# Patient Record
Sex: MALE | Race: WHITE | Employment: OTHER | URBAN - NONMETROPOLITAN AREA
[De-identification: names, ages, dates, MRNs, and addresses within clinical notes are randomized per-mention and may not be internally consistent; named-entity substitution may affect disease eponyms.]

---

## 2023-01-17 ENCOUNTER — HOSPITAL ENCOUNTER (EMERGENCY)
Age: 68
Discharge: HOME | End: 2023-01-17
Payer: MEDICARE

## 2023-01-17 ENCOUNTER — APPOINTMENT (OUTPATIENT)
Dept: CT IMAGING | Age: 68
End: 2023-01-17
Payer: MEDICARE

## 2023-01-17 VITALS
HEART RATE: 76 BPM | TEMPERATURE: 97.9 F | DIASTOLIC BLOOD PRESSURE: 71 MMHG | SYSTOLIC BLOOD PRESSURE: 144 MMHG | OXYGEN SATURATION: 95 % | RESPIRATION RATE: 18 BRPM

## 2023-01-17 DIAGNOSIS — N20.0 KIDNEY STONE: Primary | ICD-10-CM

## 2023-01-17 LAB
ANION GAP SERPL CALCULATED.3IONS-SCNC: 16 MEQ/L (ref 8–16)
BACTERIA: ABNORMAL /HPF
BASOPHILS # BLD: 0.4 %
BASOPHILS ABSOLUTE: 0 THOU/MM3 (ref 0–0.1)
BILIRUBIN URINE: NEGATIVE
BLOOD, URINE: ABNORMAL
BUN BLDV-MCNC: 15 MG/DL (ref 7–22)
CALCIUM SERPL-MCNC: 8.7 MG/DL (ref 8.5–10.5)
CASTS 2: ABNORMAL /LPF
CASTS UA: ABNORMAL /LPF
CHARACTER, URINE: CLEAR
CHLORIDE BLD-SCNC: 99 MEQ/L (ref 98–111)
CO2: 25 MEQ/L (ref 23–33)
COLOR: ABNORMAL
CREAT SERPL-MCNC: 0.9 MG/DL (ref 0.4–1.2)
CRYSTALS, UA: ABNORMAL
EOSINOPHIL # BLD: 0.3 %
EOSINOPHILS ABSOLUTE: 0 THOU/MM3 (ref 0–0.4)
EPITHELIAL CELLS, UA: ABNORMAL /HPF
ERYTHROCYTE [DISTWIDTH] IN BLOOD BY AUTOMATED COUNT: 13.8 % (ref 11.5–14.5)
ERYTHROCYTE [DISTWIDTH] IN BLOOD BY AUTOMATED COUNT: 44.5 FL (ref 35–45)
GFR SERPL CREATININE-BSD FRML MDRD: > 60 ML/MIN/1.73M2
GLUCOSE BLD-MCNC: 207 MG/DL (ref 70–108)
GLUCOSE URINE: NEGATIVE MG/DL
HCT VFR BLD CALC: 45.3 % (ref 42–52)
HEMOGLOBIN: 15.9 GM/DL (ref 14–18)
IMMATURE GRANS (ABS): 0.03 THOU/MM3 (ref 0–0.07)
IMMATURE GRANULOCYTES: 0.3 %
KETONES, URINE: ABNORMAL
LEUKOCYTE ESTERASE, URINE: NEGATIVE
LYMPHOCYTES # BLD: 23.5 %
LYMPHOCYTES ABSOLUTE: 2.6 THOU/MM3 (ref 1–4.8)
MCH RBC QN AUTO: 31 PG (ref 26–33)
MCHC RBC AUTO-ENTMCNC: 35.1 GM/DL (ref 32.2–35.5)
MCV RBC AUTO: 88.3 FL (ref 80–94)
MISCELLANEOUS 2: ABNORMAL
MONOCYTES # BLD: 3.4 %
MONOCYTES ABSOLUTE: 0.4 THOU/MM3 (ref 0.4–1.3)
NITRITE, URINE: NEGATIVE
NUCLEATED RED BLOOD CELLS: 0 /100 WBC
OSMOLALITY CALCULATION: 286.3 MOSMOL/KG (ref 275–300)
PH UA: 5 (ref 5–9)
PLATELET # BLD: 200 THOU/MM3 (ref 130–400)
PMV BLD AUTO: 9.9 FL (ref 9.4–12.4)
POTASSIUM SERPL-SCNC: 3.5 MEQ/L (ref 3.5–5.2)
PROTEIN UA: 30
RBC # BLD: 5.13 MILL/MM3 (ref 4.7–6.1)
RBC URINE: ABNORMAL /HPF
RENAL EPITHELIAL, UA: ABNORMAL
SEG NEUTROPHILS: 72.1 %
SEGMENTED NEUTROPHILS ABSOLUTE COUNT: 8.1 THOU/MM3 (ref 1.8–7.7)
SODIUM BLD-SCNC: 140 MEQ/L (ref 135–145)
SPECIFIC GRAVITY, URINE: 1.02 (ref 1–1.03)
UROBILINOGEN, URINE: 1 EU/DL (ref 0–1)
WBC # BLD: 11.2 THOU/MM3 (ref 4.8–10.8)
WBC UA: ABNORMAL /HPF
YEAST: ABNORMAL

## 2023-01-17 PROCEDURE — 74177 CT ABD & PELVIS W/CONTRAST: CPT

## 2023-01-17 PROCEDURE — 36415 COLL VENOUS BLD VENIPUNCTURE: CPT

## 2023-01-17 PROCEDURE — 96374 THER/PROPH/DIAG INJ IV PUSH: CPT

## 2023-01-17 PROCEDURE — 81001 URINALYSIS AUTO W/SCOPE: CPT

## 2023-01-17 PROCEDURE — 96375 TX/PRO/DX INJ NEW DRUG ADDON: CPT

## 2023-01-17 PROCEDURE — 74176 CT ABD & PELVIS W/O CONTRAST: CPT

## 2023-01-17 PROCEDURE — 85025 COMPLETE CBC W/AUTO DIFF WBC: CPT

## 2023-01-17 PROCEDURE — 6360000002 HC RX W HCPCS: Performed by: PHYSICIAN ASSISTANT

## 2023-01-17 PROCEDURE — 6360000004 HC RX CONTRAST MEDICATION: Performed by: PHYSICIAN ASSISTANT

## 2023-01-17 PROCEDURE — 99285 EMERGENCY DEPT VISIT HI MDM: CPT

## 2023-01-17 PROCEDURE — 80048 BASIC METABOLIC PNL TOTAL CA: CPT

## 2023-01-17 PROCEDURE — 2580000003 HC RX 258: Performed by: PHYSICIAN ASSISTANT

## 2023-01-17 RX ORDER — ONDANSETRON 2 MG/ML
4 INJECTION INTRAMUSCULAR; INTRAVENOUS ONCE
Status: COMPLETED | OUTPATIENT
Start: 2023-01-17 | End: 2023-01-17

## 2023-01-17 RX ORDER — KETOROLAC TROMETHAMINE 10 MG/1
10 TABLET, FILM COATED ORAL EVERY 6 HOURS PRN
Qty: 12 TABLET | Refills: 0 | Status: SHIPPED | OUTPATIENT
Start: 2023-01-17 | End: 2023-01-20

## 2023-01-17 RX ORDER — ONDANSETRON 4 MG/1
4 TABLET, ORALLY DISINTEGRATING ORAL 3 TIMES DAILY PRN
Qty: 20 TABLET | Refills: 0 | Status: SHIPPED | OUTPATIENT
Start: 2023-01-17

## 2023-01-17 RX ORDER — 0.9 % SODIUM CHLORIDE 0.9 %
500 INTRAVENOUS SOLUTION INTRAVENOUS ONCE
Status: COMPLETED | OUTPATIENT
Start: 2023-01-17 | End: 2023-01-17

## 2023-01-17 RX ORDER — MORPHINE SULFATE 4 MG/ML
4 INJECTION, SOLUTION INTRAMUSCULAR; INTRAVENOUS ONCE
Status: COMPLETED | OUTPATIENT
Start: 2023-01-17 | End: 2023-01-17

## 2023-01-17 RX ADMIN — HYDROMORPHONE HYDROCHLORIDE 0.5 MG: 1 INJECTION, SOLUTION INTRAMUSCULAR; INTRAVENOUS; SUBCUTANEOUS at 13:13

## 2023-01-17 RX ADMIN — ONDANSETRON 4 MG: 2 INJECTION INTRAMUSCULAR; INTRAVENOUS at 11:31

## 2023-01-17 RX ADMIN — MORPHINE SULFATE 4 MG: 4 INJECTION, SOLUTION INTRAMUSCULAR; INTRAVENOUS at 11:31

## 2023-01-17 RX ADMIN — SODIUM CHLORIDE 500 ML: 9 INJECTION, SOLUTION INTRAVENOUS at 11:23

## 2023-01-17 RX ADMIN — IOPAMIDOL 80 ML: 755 INJECTION, SOLUTION INTRAVENOUS at 15:39

## 2023-01-17 NOTE — ED PROVIDER NOTES
[address] \[facility]\"" [address] [address] EMERGENCY DEPT      EMERGENCY MEDICINE     Pt Name: Nay Martell  MRN: [id number]  [place] 1955  Date of evaluation: 1/17/2023  Provider: Juice Regalado PA-C    CHIEF COMPLAINT       Chief Complaint   Patient presents with    Flank Pain     left     HISTORY OF PRESENT ILLNESS   Nay Martell is a pleasant 74 y.o. male who presents to the emergency department from from home, by private vehicle for evaluation of left sided flank pain. Pt states that after waking up this morning normal and stool output. Shortly after, he developed sharp, constant left-sided flank pain. He states after the pain started he felt the frequent urge to urinate and defecate but is unable to. He  denies dysuria or hematuria. Pt reports some abdominal cramping which he attributes to the urge to defecate and urinate. He denies radiation of the flank pain up his back or down into his groin. The pain is a 8/10 at rest, he is unable to find a comfortable position. He endorses chills and diaphoresis since the pain began. Pt reports a past history of kidney stones almost 20 years ago. He is unable to remember if the pain is similar. He denies fever, nausea, vomiting, numbness or tingling down his legs or saddle anesthesia. PASTMEDICAL HISTORY     Past Medical History:   Diagnosis Date    Gout     Hypertension     Nephrolithiasis        Patient Active Problem List   Diagnosis Code    Nephrolithiasis N20.0    Hypertension I10    Gout M10.9     SURGICAL HISTORY     No past surgical history on file. CURRENT MEDICATIONS       Discharge Medication List as of 1/17/2023  4:24 PM        CONTINUE these medications which have NOT CHANGED    Details   allopurinol (ZYLOPRIM) 300 MG tablet Take 1 tablet by mouth daily. , Disp-90 tablet, R-3      atenolol (TENORMIN) 50 MG tablet Take 1 tablet by mouth daily. , Disp-90 tablet, R-3      lisinopril-hydrochlorothiazide (PRINZIDE;ZESTORETIC) 10-12.5 MG per tablet Take 1 tablet by mouth 2 times daily. , Disp-180 tablet, R-3             ALLERGIES     has No Known Allergies. FAMILY HISTORY     has no family status information on file. SOCIAL HISTORY       Social History     Tobacco Use    Smoking status: Never       PHYSICAL EXAM       ED Triage Vitals [01/17/23 1024]   BP Temp Temp Source Heart Rate Resp SpO2 Height Weight   (!) 144/71 97.9 °F (36.6 °C) Oral 76 18 95 % -- --       Additional Vital Signs:  Vitals:    01/17/23 1024   BP: (!) 144/71   Pulse: 76   Resp: 18   Temp: 97.9 °F (36.6 °C)   SpO2: 95%     Physical Exam  Vitals and nursing note reviewed. Constitutional:       General: He is not in acute distress. Appearance: Normal appearance. He is well-developed. He is not diaphoretic. HENT:      Head: Normocephalic and atraumatic. Eyes:      Conjunctiva/sclera: Conjunctivae normal.      Pupils: Pupils are equal, round, and reactive to light. Cardiovascular:      Rate and Rhythm: Normal rate and regular rhythm. Pulses: Normal pulses. Heart sounds: Normal heart sounds. No murmur heard. No gallop. Pulmonary:      Effort: Pulmonary effort is normal. No respiratory distress. Breath sounds: Normal breath sounds. No wheezing or rales. Abdominal:      General: Bowel sounds are normal.      Palpations: Abdomen is soft. Tenderness: There is no abdominal tenderness. There is left CVA tenderness. There is no right CVA tenderness, guarding or rebound. Musculoskeletal:         General: Normal range of motion. Cervical back: Normal range of motion. Skin:     General: Skin is warm and dry. Capillary Refill: Capillary refill takes less than 2 seconds. Neurological:      Mental Status: He is alert and oriented to person, place, and time.    Psychiatric:         Mood and Affect: Mood normal.       FORMAL DIAGNOSTIC RESULTS     RADIOLOGY: Interpretation per the Radiologist below, if available at the time of this note (none if blank):    CT ABDOMEN PELVIS W IV CONTRAST Additional Contrast? None   Final Result   1. Bosniak 1 and 2 cysts are described above   2. Subtle left hydronephrosis with a punctate calcification in the posterior left bladder likely reflecting a recently passed calculus. 3. 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 It may contain minor errors which are inherent in voice recognition technology. **      Final report electronically signed by Dr. Deana Nielson on 1/17/2023 3:59 PM      CT ABDOMEN PELVIS WO CONTRAST Additional Contrast? None   Final Result       1. No evidence of acute intra-abdominal or intrapelvic abnormality. 2. Bilateral renal cystic lesions which are not fully characterized. The left-sided cystic lesion is more poorly defined than on the right. Consider routine follow-up multiphase imaging for further characterization. 3. Hepatic steatosis. **This report has been created using voice recognition software. It may contain minor errors which are inherent in voice recognition technology. **      Final report electronically signed by Dr. Qiana Katz MD on 1/17/2023 12:37 PM          LABS: (none if blank)  Labs Reviewed   URINE WITH REFLEXED MICRO - Abnormal; Notable for the following components:       Result Value    Ketones, Urine TRACE (*)     Blood, Urine LARGE (*)     Protein, UA 30 (*)     Color, UA DK YELLOW (*)     All other components within normal limits   CBC WITH AUTO DIFFERENTIAL - Abnormal; Notable for the following components:    WBC 11.2 (*)     Segs Absolute 8.1 (*)     All other components within normal limits   BASIC METABOLIC PANEL - Abnormal; Notable for the following components:    Glucose 207 (*)     All other components within normal limits   ANION GAP   GLOMERULAR FILTRATION RATE, ESTIMATED   OSMOLALITY       (Any cultures that may have been sent were not resulted at the time of this patient visit)    [facility] / ED COURSE:     1) Number and Complexity of Problems            Problem List This Visit:         Chief Complaint   Patient presents with    Flank Pain     left   Presents for complaints of left flank pain that began acutely this morning. No associated abdominal pain. No history of aneurysm. Laboratory studies revealed hyperglycemia with white blood cell count 1.2 and large amount of blood on urinalysis. He was treated initially with morphine and Zofran without significant change in his pain. He was later given Dilaudid which did provide improvement. Initial CT of the abdomen showed no acute abnormality. Bilateral renal cystic lesions were demonstrated but not fully characterized with left-sided lesion more poorly defined in the right. Given the persistence of his symptoms after morphine, discussed with attending provider and CT was added for evaluation of possible renal infarct versus hemorrhagic renal cyst.  This revealed Bosniak 1 and 2 cysts as described in the body of the report with very mild left hydroureteronephrosis. Punctate calcification in the posterior left bladder and possible emphysematous cystitis. At this time, patient's pain is notably improved. Suspect nonvisualized stone on initial imaging, now passed into the bladder. Discussed this with the patient who agrees. He feels comfortable with plan of discharge home. Will discharge with short course of symptomatic treatment and he should follow-up with urology service should his symptoms persist.  He was agreeable with the above plan and denied further needs upon discharge.         Differential Diagnosis includes (but not limited to):  Nephrolithiasis, pyelonephritis, renal infarct, hemorrhagic cyst        Diagnoses Considered but I have low suspicion of:   Aortic dissection mesenteric ischemia             Pertinent Comorbid Conditions:    N/A    2)  Data Reviewed (none if left blank)          My Independent interpretations:     EKG:      N/A    Imaging: Initially, revealing evidence of renal cysts but no acute abnormality. Follow-up with contrast supports recently passed left ureteral stone in addition to Bosniak 1 and 2 cysts    Labs:      Large amount of blood in urine. Otherwise noncontributory. Decision Rules/Clinical Scores utilized:              External Documentation Reviewed:         Previous patient encounter documents & history available on EMR was reviewed              See Formal Diagnostic Results above for the lab and radiology tests and orders. 3)  Treatment and Disposition         ED Reassessment: Symptoms improved         Case discussed with consulting clinician: Attending provider         Shared Decision-Making was performed and disposition discussed with the        Patient/Family and questions answered          Social determinants of health impacting treatment or disposition: None         Code Status: Full      Summary of Patient Presentation:      MDM  /   Vitals Reviewed:    Vitals:    01/17/23 1024   BP: (!) 144/71   Pulse: 76   Resp: 18   Temp: 97.9 °F (36.6 °C)   TempSrc: Oral   SpO2: 95%       The patient was seen and examined. Appropriate diagnostic testing was performed and results reviewed with the patient. The results of pertinent diagnostic studies and exam findings were discussed. The patients provisional diagnosis and plan of care were discussed with the patient and present family who expressed understanding. Any medications were reviewed and indications and risks of medications were discussed with the patient /family present. Strict verbal and written return precautions, instructions and appropriate follow-up provided to  the patient.      ED Medications administered this visit:  (None if blank)  Medications   0.9 % sodium chloride bolus (0 mLs IntraVENous Stopped 1/17/23 1630)   morphine injection 4 mg (4 mg IntraVENous Given 1/17/23 1131) ondansetron (ZOFRAN) injection 4 mg (4 mg IntraVENous Given 1/17/23 1131)   HYDROmorphone (DILAUDID) injection 0.5 mg (0.5 mg IntraVENous Given 1/17/23 1313)   iopamidol (ISOVUE-370) 76 % injection 80 mL (80 mLs IntraVENous Given 1/17/23 1539)         PROCEDURES: (None if blank)  Procedures:     CRITICAL CARE: (None if blank)      DISCHARGE PRESCRIPTIONS: (None if blank)  Discharge Medication List as of 1/17/2023  4:24 PM        START taking these medications    Details   ondansetron (ZOFRAN-ODT) 4 MG disintegrating tablet Take 1 tablet by mouth 3 times daily as needed for Nausea or Vomiting, Disp-20 tablet, R-0Normal      ketorolac (TORADOL) 10 MG tablet Take 1 tablet by mouth every 6 hours as needed for Pain, Disp-12 tablet, R-0Normal             FINAL IMPRESSION      1. Kidney stone          DISPOSITION/PLAN   DISPOSITION Decision To Discharge 01/17/2023 04:23:45 PM      OUTPATIENT FOLLOW UP THE PATIENT:  [address]  [address] [facility] [address] [phone]  Call   As needed    [address] [facility] [address] [address] EMERGENCY DEPT  [address]    If symptoms worsen    JACK Villeda PA-C  01/17/23 [id number]

## 2023-01-17 NOTE — ED NOTES
Patient presents to the ED with complaints of left side flank pain. He has no other complaints at this time.       Helena Rebollar LPN  55/08/36 6429

## 2023-09-25 LAB
A/G RATIO: 1.4 (ref 1.5–2.5)
ABSOLUTE BASO #: 0 /CMM (ref 0–200)
ABSOLUTE EOS #: 100 /CMM (ref 0–500)
ABSOLUTE LYMPH #: 3300 /CMM (ref 1000–4800)
ABSOLUTE MONO #: 500 /CMM (ref 0–800)
ABSOLUTE NEUT #: 3500 /CMM (ref 1800–7700)
ALBUMIN SERPL-MCNC: 4.3 G/DL (ref 3.5–5)
ALP BLD-CCNC: 71 IU/L (ref 41–137)
ALT SERPL-CCNC: 18 IU/L (ref 10–40)
ANION GAP SERPL CALCULATED.3IONS-SCNC: 9 MMOL/L (ref 4–12)
APPEARANCE: CLEAR
AST SERPL-CCNC: 21 IU/L (ref 15–41)
BASOPHILS RELATIVE PERCENT: 0.5 % (ref 0–2)
BILIRUB SERPL-MCNC: 1 MG/DL (ref 0.2–1)
BILIRUBIN URINE: NEGATIVE
BUN BLDV-MCNC: 13 MG/DL (ref 7–20)
CALCIUM SERPL-MCNC: 9.6 MG/DL (ref 8.8–10.5)
CHLORIDE BLD-SCNC: 101 MEQ/L (ref 101–111)
CHOLESTEROL/HDL RELATIVE RISK: 2.8 (ref 4–5)
CHOLESTEROL: 122 MG/DL
CO2: 29 MEQ/L (ref 21–32)
COLOR: YELLOW
CREAT SERPL-MCNC: 0.87 MG/DL (ref 0.6–1.3)
CREATININE CLEARANCE: >60
DIRECT-LDL / HDL RISK: 1.6
EOSINOPHILS RELATIVE PERCENT: 1.2 % (ref 0–6)
ESTIMATED AVERAGE GLUCOSE: 126 MG/DL
GLUCOSE URINE: NEGATIVE
GLUCOSE: 112 MG/DL (ref 70–110)
HBA1C MFR BLD: 6 % (ref 4.4–6.4)
HCT VFR BLD CALC: 47.4 % (ref 40–49)
HDLC SERPL-MCNC: 43 MG/DL
HEMOGLOBIN: 16.8 GM/DL (ref 13.5–16.5)
KETONES, URINE: ABNORMAL
LDL CHOLESTEROL DIRECT: 70 MG/DL
LEUKOCYTES, UA: NEGATIVE
LYMPHOCYTES RELATIVE PERCENT: 44.1 % (ref 15–45)
MCH RBC QN AUTO: 31.7 PG (ref 27.5–33)
MCHC RBC AUTO-ENTMCNC: 35.4 GM/DL (ref 33–36)
MCV RBC AUTO: 89.5 CU MIC (ref 80–97)
MONOCYTES RELATIVE PERCENT: 7.2 % (ref 2–10)
MUCUS: PRESENT
NEUTROPHILS RELATIVE PERCENT: 47 % (ref 40–70)
NITRITE, URINE: NEGATIVE
NUCLEATED RBCS: 0.7 /100 WBC
PDW BLD-RTO: 13.6 % (ref 12–16)
PH, URINE: 5.5 (ref 5–8)
PLATELET # BLD: 194 TH/CMM (ref 150–400)
POTASSIUM SERPL-SCNC: 3.4 MEQ/L (ref 3.6–5)
PROSTATE SPECIFIC ANTIGEN: 1.71 NG/ML
PROTEIN, URINE: NEGATIVE
RBC # BLD: 5.3 MIL/CMM (ref 4.5–6)
RBC URINE: ABNORMAL /HPF
SODIUM BLD-SCNC: 139 MEQ/L (ref 135–145)
SPECIFIC GRAVITY UA: 1.03 (ref 1–1.03)
SQUAMOUS EPITHELIAL: ABNORMAL /HPF
TOTAL PROTEIN: 7.3 G/DL (ref 6.2–8)
TRIGL SERPL-MCNC: 106 MG/DL
URIC ACID: 5.5 MG/DL (ref 4.8–8.7)
URINALYSIS REFLEX: NO
URINE HGB: NEGATIVE
UROBILINOGEN, URINE: ABNORMAL (ref 0.2–1)
VLDLC SERPL CALC-MCNC: 21 MG/DL
WBC # BLD: 7.4 TH/CMM (ref 4.4–10.5)
WBC URINE: ABNORMAL /HPF